# Patient Record
Sex: FEMALE | Race: WHITE | NOT HISPANIC OR LATINO
[De-identification: names, ages, dates, MRNs, and addresses within clinical notes are randomized per-mention and may not be internally consistent; named-entity substitution may affect disease eponyms.]

---

## 2020-07-24 ENCOUNTER — APPOINTMENT (OUTPATIENT)
Dept: RHEUMATOLOGY | Facility: CLINIC | Age: 45
End: 2020-07-24
Payer: COMMERCIAL

## 2020-07-24 VITALS
SYSTOLIC BLOOD PRESSURE: 122 MMHG | HEIGHT: 64.5 IN | BODY MASS INDEX: 29.85 KG/M2 | DIASTOLIC BLOOD PRESSURE: 78 MMHG | WEIGHT: 177 LBS

## 2020-07-24 DIAGNOSIS — Z82.49 FAMILY HISTORY OF ISCHEMIC HEART DISEASE AND OTHER DISEASES OF THE CIRCULATORY SYSTEM: ICD-10-CM

## 2020-07-24 DIAGNOSIS — Z87.2 PERSONAL HISTORY OF DISEASES OF THE SKIN AND SUBCUTANEOUS TISSUE: ICD-10-CM

## 2020-07-24 DIAGNOSIS — M25.50 PAIN IN UNSPECIFIED JOINT: ICD-10-CM

## 2020-07-24 DIAGNOSIS — Z86.19 PERSONAL HISTORY OF OTHER INFECTIOUS AND PARASITIC DISEASES: ICD-10-CM

## 2020-07-24 PROBLEM — Z00.00 ENCOUNTER FOR PREVENTIVE HEALTH EXAMINATION: Status: ACTIVE | Noted: 2020-07-24

## 2020-07-24 PROCEDURE — 99243 OFF/OP CNSLTJ NEW/EST LOW 30: CPT | Mod: 25

## 2020-07-24 PROCEDURE — 36415 COLL VENOUS BLD VENIPUNCTURE: CPT

## 2020-07-25 LAB
ALBUMIN SERPL ELPH-MCNC: 4.9 G/DL
ALP BLD-CCNC: 70 U/L
ALT SERPL-CCNC: 21 U/L
ANION GAP SERPL CALC-SCNC: 13 MMOL/L
AST SERPL-CCNC: 16 U/L
BASOPHILS # BLD AUTO: 0.07 K/UL
BASOPHILS NFR BLD AUTO: 1.1 %
BILIRUB SERPL-MCNC: 0.3 MG/DL
BUN SERPL-MCNC: 15 MG/DL
CALCIUM SERPL-MCNC: 9.9 MG/DL
CCP AB SER IA-ACNC: <8 UNITS
CHLORIDE SERPL-SCNC: 103 MMOL/L
CO2 SERPL-SCNC: 23 MMOL/L
CREAT SERPL-MCNC: 0.82 MG/DL
CRP SERPL-MCNC: 0.29 MG/DL
DSDNA AB SER-ACNC: <12 IU/ML
EOSINOPHIL # BLD AUTO: 0.11 K/UL
EOSINOPHIL NFR BLD AUTO: 1.7 %
ERYTHROCYTE [SEDIMENTATION RATE] IN BLOOD BY WESTERGREN METHOD: 15 MM/HR
GLUCOSE SERPL-MCNC: 93 MG/DL
HCT VFR BLD CALC: 36.9 %
HGB BLD-MCNC: 12.1 G/DL
IMM GRANULOCYTES NFR BLD AUTO: 0.2 %
LYMPHOCYTES # BLD AUTO: 2.25 K/UL
LYMPHOCYTES NFR BLD AUTO: 34.4 %
MAN DIFF?: NORMAL
MCHC RBC-ENTMCNC: 30.7 PG
MCHC RBC-ENTMCNC: 32.8 GM/DL
MCV RBC AUTO: 93.7 FL
MONOCYTES # BLD AUTO: 0.45 K/UL
MONOCYTES NFR BLD AUTO: 6.9 %
NEUTROPHILS # BLD AUTO: 3.66 K/UL
NEUTROPHILS NFR BLD AUTO: 55.7 %
PLATELET # BLD AUTO: 272 K/UL
POTASSIUM SERPL-SCNC: 4.3 MMOL/L
PROT SERPL-MCNC: 6.8 G/DL
RBC # BLD: 3.94 M/UL
RBC # FLD: 12.5 %
RF+CCP IGG SER-IMP: NEGATIVE
RHEUMATOID FACT SER QL: 10 IU/ML
SODIUM SERPL-SCNC: 139 MMOL/L
WBC # FLD AUTO: 6.55 K/UL

## 2020-07-26 NOTE — HISTORY OF PRESENT ILLNESS
[FreeTextEntry1] : 43 yo female referred by her PCP Dr. Méndez for further evaluation of joint pain.\par In 2016, she had Lyme Disease.  Since then she has had swelling of her fingers and her middle finger is sausage-y.  Lyme disease was diagnosed when she presented to her PMD with pain in her fingers.  Lyme tested positive and she was treated with 2 rounds of Abx.  Never saw a tick or rash.\par She saw Dr. Neumann a year ago, but she has since retired.  She took MTX for 3-4 months, but then she got itchy/burning skin, so she stopped it, and this symptom resolved.  MTX did help with the pain and swelling.  \par In March she was sick and think she had COVID.  She did test positive for antibodies.  \par After she had COVID, she had a flare-up.  Since March she has pain in her hands, especially with exposure to cold.  She takes Aleve, which helps.\par No personal or family history of psoriasis.\par + weight gain despite not changing her eating habits.  She has been exercising more.\par No other joint pains besides her hands.\par She has right sided sciatica.  She has done PT twice, which helps.  + low back stiffness, which is mostly first thing in the morning.  She is aware of the stiffness when she has been standing or sitting for too long.\par She get spimples on her chest ccasionally.  Not related to sun.  No photosensitivity.  + dry eyes due to contacts\par No oral ulcers.\par No Raynauds.\par

## 2020-07-27 LAB
ANA SER IF-ACNC: NEGATIVE
CHROMATIN AB SERPL-ACNC: <0.2 AL
ENA JO1 AB SER IA-ACNC: <0.2 AL
ENA RNP AB SER IA-ACNC: <0.2 AL
ENA SM AB SER IA-ACNC: <0.2 AL
ENA SS-A AB SER IA-ACNC: <0.2 AL
ENA SS-B AB SER IA-ACNC: <0.2 AL

## 2020-07-28 LAB

## 2020-07-29 ENCOUNTER — RESULT REVIEW (OUTPATIENT)
Age: 45
End: 2020-07-29

## 2020-07-29 LAB — RNA POLYMERASE III IGG: 4 UNITS

## 2020-08-14 ENCOUNTER — APPOINTMENT (OUTPATIENT)
Dept: RHEUMATOLOGY | Facility: CLINIC | Age: 45
End: 2020-08-14
Payer: COMMERCIAL

## 2020-08-14 VITALS
DIASTOLIC BLOOD PRESSURE: 80 MMHG | WEIGHT: 177 LBS | SYSTOLIC BLOOD PRESSURE: 120 MMHG | BODY MASS INDEX: 29.85 KG/M2 | HEIGHT: 64.5 IN

## 2020-08-14 DIAGNOSIS — M15.4 EROSIVE (OSTEO)ARTHRITIS: ICD-10-CM

## 2020-08-14 PROCEDURE — 99215 OFFICE O/P EST HI 40 MIN: CPT

## 2020-08-16 NOTE — ASSESSMENT
[FreeTextEntry1] : xrays reviewed together: erosive OA of DIP3\par To apply diclofenac gel to painful joints\par We discussed a trial of Plaquenil for erosive OA.  Side effect profile discussed, along with the needs for yearly ophthalmologic exams.

## 2020-08-16 NOTE — HISTORY OF PRESENT ILLNESS
[FreeTextEntry1] : She has painin her middle fingers in the morning.  + joint swelling.  + morning stiffness. left is worse than right (She is left handed)\par No new rashes.

## 2022-12-16 ENCOUNTER — APPOINTMENT (OUTPATIENT)
Dept: PAIN MANAGEMENT | Facility: CLINIC | Age: 47
End: 2022-12-16

## 2022-12-16 ENCOUNTER — NON-APPOINTMENT (OUTPATIENT)
Age: 47
End: 2022-12-16

## 2022-12-16 VITALS
HEIGHT: 64.5 IN | WEIGHT: 153 LBS | DIASTOLIC BLOOD PRESSURE: 83 MMHG | BODY MASS INDEX: 25.8 KG/M2 | SYSTOLIC BLOOD PRESSURE: 136 MMHG | TEMPERATURE: 98 F

## 2022-12-16 PROCEDURE — 99204 OFFICE O/P NEW MOD 45 MIN: CPT

## 2022-12-16 RX ORDER — HYDROXYCHLOROQUINE SULFATE 200 MG/1
200 TABLET, FILM COATED ORAL
Qty: 30 | Refills: 1 | Status: DISCONTINUED | COMMUNITY
Start: 2020-08-14 | End: 2022-12-16

## 2022-12-16 RX ORDER — GABAPENTIN 300 MG/1
300 CAPSULE ORAL
Qty: 30 | Refills: 0 | Status: ACTIVE | COMMUNITY
Start: 2022-12-16 | End: 1900-01-01

## 2022-12-16 NOTE — HISTORY OF PRESENT ILLNESS
[4] : 3. What number best describes how, during the past week, pain has interfered with your general activity? 4/10 pain [___ yrs] : [unfilled] year(s) ago [Constant] : constant [8] : an average pain level of 8/10 [9] : a maximum pain level of 9/10 [Sharp] : sharp [Dull] : dull [Aching] : aching [Throbbing] : throbbing [Shooting] : shooting [Laying] : laying [Sitting] : sitting [Heat] : heat [Ice] : ice [Other: ___] : [unfilled] [FreeTextEntry1] : HPI\par \par Ms. DEBRA CHAPIN is a 47 year F with pmhx of lyme disease, presents with back and left leg pain radiating to anterolateral thigh and shin pain.  Pain is so bad that patient finds it difficult to perform adls and ambulate. denies any worsening numbness, weakness, bowel/bladder dysfunction. \par \par \par Previous and current pain medications/doses/effects:\par \par Alleve\par Advil\par \par Previous Pain Treatments:\par \par PT\par \par Previous Pain Injections:\par \par NA\par \par Previous Diagnostic Studies/Images:\par \par XR BL Hand 7/20\par \par There is no acute fracture or dislocation. There is diffuse interphalangeal joint space narrowing\par and suggestion of possible early central erosions involving the bilateral third distal\par interphalangeal joints, which may be seen in the setting of inflammatory arthropathy. There is no\par focal soft tissue abnormality.\par \par \par  Impression:\par \par  Findings which may be seen in the setting of inflammatory arthropathy, most pronounced in the\par bilateral third distal interphalangeal joints. [FreeTextEntry2] : 12 [FreeTextEntry7] : Lower back pain , radiating down left side  [FreeTextEntry4] : stretching , aleve, advil

## 2022-12-16 NOTE — CONSULT LETTER
[Dear  ___] : Dear  [unfilled], [Consult Letter:] : I had the pleasure of evaluating your patient, [unfilled]. [Please see my note below.] : Please see my note below. [Consult Closing:] : Thank you very much for allowing me to participate in the care of this patient.  If you have any questions, please do not hesitate to contact me. [Sincerely,] : Sincerely, [FreeTextEntry3] : \par Maribeth Roach DO, MBA\par Director, Pain Management Center\par  of Anesthesiology\par [employer] [employer] [facility]\par \par \par

## 2022-12-16 NOTE — ASSESSMENT
[FreeTextEntry1] : >> Imaging and Other Studies\par \par I personally reviewed the relevant imaging.  Discussed and explained to patient the likely source of pathology and pain.  Questions answered.\par \par back and leg pain likely secondary to lumbar radiculopathy and discogenic pain refractory to conservative treatments including 6 consecutive weeks of home exercises/PT, will obtain MRI LS to evaluate for pathology\par \par may consider PT vs intervention pending eval\par \par >> Therapy and Other Modalities\par \par hold exercises\par \par >> Medications\par \par trial gabapentin nightly\par cautioned change in mood.  Encouraged to call with any worsening mood or depression/suicidal ideations\par  \par >> Interventions\par \par may consider intervention\par >> Consults\par \par >> Discussion of Risks/Benefits/Alternatives\par \par 	>Regarding any scheduled procedures:\par \par I have discussed in detail with the patient that any interventional pain procedure is associated with potential risks.  The procedure may include an injection of steroids and potentially other medications (local anesthetic and normal saline) into the epidural space or surrounding tissue of the spine.  There are significant risks of this procedure which include and are not limited to infection, bleeding, worsening pain, dural puncture leading to postdural puncture headache, nerve damage, spinal cord injury, paralysis, stroke, and death.  \par \par There is a chance that the procedure does not improve their pain.  \par \par There are risks associated with the steroid being absorbed into the body systemically.  These include dysphoria, difficulty sleeping, mood swings and personality changes.  Premenopausal women may notice an irregularity in her menstrual cycle for 2-3 months following the injection.  Steroids can specifically affect patients with hypertension, diabetes, and peptic ulcers.  The procedure may cause a temporary increase in blood pressure and blood pressure, and may adversely affect a peptic ulcer.  Other, more rare complications, include avascular necrosis of joints, glaucoma and worsening of osteoporosis. \par \par I have discussed the risks of the procedure at length with the patient, and the potential benefits of pain relief.  I have offered alternatives to the procedure.  All questions were answered.  \par \par The patient expressed understanding and wishes to proceed with the procedure.\par \par 	>Regarding COVID19 Pandemic: \par \par Any planned interventional pain procedure are scheduled because further delay may cause harm or negative outcome to patient.  The goal in performing this procedure is to avoid deterioration of function, emergency room visits (which increases exposure) and reliance on opioids.  \par \par r/b/a discussed with patient, lack of evidence to conclusively determine whether pain management procedures have any positive or negative impact on the possibility of rosa maria the virus and/or development of any sequelae. \par \par Patient counselled regarding timing steroid based intervention 2 weeks before or after COVID-19 vaccine administration to avoid any interaction or affect on efficacy of vaccination\par \par Patient demonstrates understanding\par \par Informed patient that risks associated with the COVID-19 infection.  Informed patient steps taken to limit the risks.  We are implementing safety precautions and following protocols consistent with the CDC and state recommendations. All patients and staff will be checked for fever or signs of illness upon entry to the facility. We will limit our steroid dose to the lowest effective therapeutic dose or in some cases steroids will not be injected at all. \par \par Patient agrees to proceed\par \par >> Conclusion\par \par The above diagnosis and treatment plan is medically reasonable and necessary based on the patient encounter \par There were no barriers to communication.\par Informed patient that I would be available for any additional questions.\par Patient was instructed to call with any worsening symptoms including severe pain, new numbness/weakness, or changes in the bowel/bladder function. \par Discussed role of nsaids in pain management and all relevant risks, if patient is continuing to require after 4 weeks the patient should f/u for alternative treatment. \par Instructed patient to maintain pain diary to monitor pain level, mobility, and function.\par \par The referring provider was informed of the above diagnosis and treatment plan.\par

## 2022-12-16 NOTE — REASON FOR VISIT
[Initial Consultation] : an initial pain management consultation [FreeTextEntry2] : Referred by Dr. Méndez

## 2022-12-16 NOTE — REVIEW OF SYSTEMS
[Back Pain] : back pain [Radiating Pain] : radiating pain [Joint Pain] : joint pain [Numbness] : numbness [Negative] : Heme/Lymph

## 2022-12-16 NOTE — PHYSICAL EXAM
[de-identified] : Constitutional: Normal, well developed, no acute distress\par Eyes: Symmetric, External structures \par Oropharynx: Lips normal, symmetric, no external lesions appreciated\par Respiratory: Non-labored breathing, no audible wheezes\par Cardiac: Pulse palpated, no tachycardia\par Vascular: No cyanosis appreciated, no edema in bilateral lower extremities\par GI: Nondistended, no jaundice appreciated\par Neurovascular: CN2-12 grossly intact, Alert and oriented\par MSK: Normal muscle bulk, 5/5 Motor strength B/L in LE\par \par

## 2023-01-12 ENCOUNTER — RESULT REVIEW (OUTPATIENT)
Age: 48
End: 2023-01-12

## 2023-01-19 ENCOUNTER — APPOINTMENT (OUTPATIENT)
Dept: PAIN MANAGEMENT | Facility: CLINIC | Age: 48
End: 2023-01-19
Payer: COMMERCIAL

## 2023-01-19 VITALS
HEIGHT: 64.5 IN | DIASTOLIC BLOOD PRESSURE: 80 MMHG | SYSTOLIC BLOOD PRESSURE: 118 MMHG | WEIGHT: 155 LBS | BODY MASS INDEX: 26.14 KG/M2

## 2023-01-19 PROCEDURE — 99214 OFFICE O/P EST MOD 30 MIN: CPT

## 2023-01-19 NOTE — HISTORY OF PRESENT ILLNESS
[___ yrs] : [unfilled] year(s) ago [Constant] : constant [8] : an average pain level of 8/10 [9] : a maximum pain level of 9/10 [Sharp] : sharp [Dull] : dull [Aching] : aching [Throbbing] : throbbing [Shooting] : shooting [Laying] : laying [Sitting] : sitting [Heat] : heat [Ice] : ice [Other: ___] : [unfilled] [FreeTextEntry1] : Interval Note:\par \par  \par \par Since last visit the pain is unchanged. Pain 8/10, worst at night. Pain to left anterolateral thigh and shin persists. \par Difficult to perform adls and sleep. Denies any recent infections.  Pain is so bad that patient finds it difficult to perform adls and ambulate. Denies any additional weakness, numbness, bowel/bladder dysfunction.  \par \par HPI\par \par Ms. DEBRA CHAPIN is a 47 year F with pmhx of lyme disease, presents with back and left leg pain radiating to anterolateral thigh and shin pain.  Pain is so bad that patient finds it difficult to perform adls and ambulate. denies any worsening numbness, weakness, bowel/bladder dysfunction. \par \par \par Previous and current pain medications/doses/effects:\par \par Alleve\par Advil\par \par Previous Pain Treatments:\par \par PT\par \par Previous Pain Injections:\par \par NA\par \par Previous Diagnostic Studies/Images:\par \par MRI LS 1/23\par \par LOWER THORACIC SPINE: No spinal canal or neuroforaminal stenosis.\par \par  L1-L2: No spinal canal or neuroforaminal stenosis.\par  L2-L3: No spinal canal or neuroforaminal stenosis.\par  L3-L4: There is a mild diffuse disc bulge flattening the ventral thecal sac and in close proximity\par to the right distal foraminal L3 exiting nerve root. There is mild left and mild to moderate right\par foraminal narrowing. There is moderate facet and ligamentous hypertrophy. There is no evidence of\par spinal canal stenosis.\par  L4-L5: There is a mild diffuse disc bulge mildly flattening the ventral thecal sac in close\par proximity to the distal right L4 foraminal exiting nerve root and contacting the bilateral\par descending L5 nerve roots. There is bilateral lateral recess narrowing. There is moderate bilateral\par foraminal narrowing. The left L4 foraminal exiting nerve root is within normal limits. There is\par moderate facet and ligamentous hypertrophy. The constellation of findings is causing mild spinal\par canal stenosis..\par  L5-S1: No spinal canal or neuroforaminal stenosis. The bilateral neuroforamen are patent. 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 L3/L4 no evidence of spinal canal stenosis.\par \par  L4-L5 mild diffuse disc bulge mildly in close proximity to the distal right L4 foraminal exiting\par nerve root and contacting the bilateral descending L5 nerve roots. L4/L5 mild spinal canal\par stenosis..\par \par \par XR BL Hand 7/20\par \par There is no acute fracture or dislocation. There is diffuse interphalangeal joint space narrowing\par and suggestion of possible early central erosions involving the bilateral third distal\par interphalangeal joints, which may be seen in the setting of inflammatory arthropathy. There is no\par focal soft tissue abnormality.\par \par \par  Impression:\par \par  Findings which may be seen in the setting of inflammatory arthropathy, most pronounced in the\par bilateral third distal interphalangeal joints. [FreeTextEntry7] : Lower back pain , radiating down left side  [FreeTextEntry4] : stretching , aleve, advil

## 2023-01-19 NOTE — ASSESSMENT
[FreeTextEntry1] : >> Imaging and Other Studies\par \par I personally reviewed the relevant imaging.  Discussed and explained to patient the likely source of pathology and pain.  Questions answered. MRI\par \par >> Therapy and Other Modalities\par \par hold exercises\par \par >> Medications\par \par gabapentin nightly\par cautioned change in mood.  Encouraged to call with any worsening mood or depression/suicidal ideations\par  \par >> Interventions\par \par Significant component of back and leg pain likely secondary to lumbar spinal stenosis demonstrated on MRI LS.  Will schedule L4-5 interlaminar epidural steroid injection r/b/a discussed\par \par \par >> Consults\par \par >> Discussion of Risks/Benefits/Alternatives\par \par 	>Regarding any scheduled procedures:\par \par I have discussed in detail with the patient that any interventional pain procedure is associated with potential risks.  The procedure may include an injection of steroids and potentially other medications (local anesthetic and normal saline) into the epidural space or surrounding tissue of the spine.  There are significant risks of this procedure which include and are not limited to infection, bleeding, worsening pain, dural puncture leading to postdural puncture headache, nerve damage, spinal cord injury, paralysis, stroke, and death.  \par \par There is a chance that the procedure does not improve their pain.  \par \par There are risks associated with the steroid being absorbed into the body systemically.  These include dysphoria, difficulty sleeping, mood swings and personality changes.  Premenopausal women may notice an irregularity in her menstrual cycle for 2-3 months following the injection.  Steroids can specifically affect patients with hypertension, diabetes, and peptic ulcers.  The procedure may cause a temporary increase in blood pressure and blood pressure, and may adversely affect a peptic ulcer.  Other, more rare complications, include avascular necrosis of joints, glaucoma and worsening of osteoporosis. \par \par I have discussed the risks of the procedure at length with the patient, and the potential benefits of pain relief.  I have offered alternatives to the procedure.  All questions were answered.  \par \par The patient expressed understanding and wishes to proceed with the procedure.\par \par 	>Regarding COVID19 Pandemic: \par \par Any planned interventional pain procedure are scheduled because further delay may cause harm or negative outcome to patient.  The goal in performing this procedure is to avoid deterioration of function, emergency room visits (which increases exposure) and reliance on opioids.  \par \par r/b/a discussed with patient, lack of evidence to conclusively determine whether pain management procedures have any positive or negative impact on the possibility of rosa maria the virus and/or development of any sequelae. \par \par Patient counselled regarding timing steroid based intervention 2 weeks before or after COVID-19 vaccine administration to avoid any interaction or affect on efficacy of vaccination\par \par Patient demonstrates understanding\par \par Informed patient that risks associated with the COVID-19 infection.  Informed patient steps taken to limit the risks.  We are implementing safety precautions and following protocols consistent with the CDC and state recommendations. All patients and staff will be checked for fever or signs of illness upon entry to the facility. We will limit our steroid dose to the lowest effective therapeutic dose or in some cases steroids will not be injected at all. \par \par Patient agrees to proceed\par \par >> Conclusion\par \par The above diagnosis and treatment plan is medically reasonable and necessary based on the patient encounter \par There were no barriers to communication.\par Informed patient that I would be available for any additional questions.\par Patient was instructed to call with any worsening symptoms including severe pain, new numbness/weakness, or changes in the bowel/bladder function. \par Discussed role of nsaids in pain management and all relevant risks, if patient is continuing to require after 4 weeks the patient should f/u for alternative treatment. \par Instructed patient to maintain pain diary to monitor pain level, mobility, and function.\par \par

## 2023-01-19 NOTE — PHYSICAL EXAM
[Normal] : Well developed, in no acute distress, alert and oriented to person, place and time [Normal muscle bulk without asymmetry] : normal muscle bulk without asymmetry [Facet Tenderness] : no facet tenderness [] : Motor: [NL] : normal and symmetric bilaterally

## 2023-01-20 ENCOUNTER — APPOINTMENT (OUTPATIENT)
Dept: PAIN MANAGEMENT | Facility: CLINIC | Age: 48
End: 2023-01-20
Payer: COMMERCIAL

## 2023-01-20 VITALS
HEART RATE: 84 BPM | RESPIRATION RATE: 16 BRPM | BODY MASS INDEX: 26.46 KG/M2 | SYSTOLIC BLOOD PRESSURE: 120 MMHG | HEIGHT: 64 IN | DIASTOLIC BLOOD PRESSURE: 79 MMHG | WEIGHT: 155 LBS | OXYGEN SATURATION: 100 %

## 2023-01-20 PROCEDURE — 62323 NJX INTERLAMINAR LMBR/SAC: CPT | Mod: LT

## 2023-01-20 RX ADMIN — Medication %: at 00:00

## 2023-01-20 RX ADMIN — TRIAMCINOLONE ACETONIDE 0 MG/ML: 80 INJECTION, SUSPENSION INTRA-ARTICULAR; INTRAMUSCULAR at 00:00

## 2023-01-20 RX ADMIN — IOHEXOL 0 MG/ML: 180 INJECTION INTRAVENOUS at 00:00

## 2023-01-20 NOTE — PROCEDURE
[FreeTextEntry1] : INTERLAMINAR EPIDURAL STEROID INJECTION\par \par The patient was placed in the prone position on the fluoroscopic table with a pillow under the abdomen.  Routine monitors were applied.  A surgical timeout was performed.  The back was prepped and draped in the usual sterile fashion and sterile technique was adhered to throughout the entire procedure.\par \par The [L4-5] was identified under fluroscopic guidance.  The vertebral body end plates were aligned and the spinous process was midline between the lateral processes.  The skin and subcutaneous tissues were infiltrated with [1% Lidocaine].  After adequate local anesthesia a ["20g Tuohy"] needle was inserted at [L4-5]   and aimed towards LEFT side.  \par \par Using a loss of resistance to air technique the epidural space was identified.  After negative aspiration for heme and CSF, 1cc Omnipaque N180 contrast dye was injected.  Epidural spread of contrast was confirmed in the AP and lateral views.  The patient was injected with a mixture of 80mg kenalog with 1cc lidocaine 1% and 2cc of PF normal saline in incremental amounts.\par \par The patient tolerated the procedure well.  There was no neurological deficit post procedure.  The patient went to recovery room in stable condition.  Discharge instructions were given to the patient.\par

## 2023-01-25 ENCOUNTER — NON-APPOINTMENT (OUTPATIENT)
Age: 48
End: 2023-01-25

## 2023-02-03 ENCOUNTER — APPOINTMENT (OUTPATIENT)
Dept: PAIN MANAGEMENT | Facility: CLINIC | Age: 48
End: 2023-02-03
Payer: COMMERCIAL

## 2023-02-03 VITALS
BODY MASS INDEX: 26.46 KG/M2 | SYSTOLIC BLOOD PRESSURE: 111 MMHG | DIASTOLIC BLOOD PRESSURE: 76 MMHG | HEIGHT: 64 IN | TEMPERATURE: 98 F | WEIGHT: 155 LBS

## 2023-02-03 PROCEDURE — 99214 OFFICE O/P EST MOD 30 MIN: CPT

## 2023-02-03 NOTE — HISTORY OF PRESENT ILLNESS
[2] : 3. What number best describes how, during the past week, pain has interfered with your general activity? 2/10 pain [___ yrs] : [unfilled] year(s) ago [Constant] : constant [8] : an average pain level of 8/10 [9] : a maximum pain level of 9/10 [Sharp] : sharp [Dull] : dull [Aching] : aching [Throbbing] : throbbing [Shooting] : shooting [Laying] : laying [Sitting] : sitting [Heat] : heat [Ice] : ice [Other: ___] : [unfilled] [FreeTextEntry1] : Interval Note:\par \par  \par sp L4-5 interlaminar epidural steroid injection 1/20/23 with 100% improvement in pain.  Patient doing very well.  Patient reports that she is having some night sweats and heavy period currently but otherwise minimal pain and able to tolerate exercises and adls. \par . Denies any additional weakness, numbness, bowel/bladder dysfunction.  \par \par HPI\par \par Ms. DEBRA CHAPIN is a 47 year F with pmhx of lyme disease, presents with back and left leg pain radiating to anterolateral thigh and shin pain.  Pain is so bad that patient finds it difficult to perform adls and ambulate. denies any worsening numbness, weakness, bowel/bladder dysfunction. \par \par \par Previous and current pain medications/doses/effects:\par \par Alleve\par Advil\par \par Previous Pain Treatments:\par \par PT\par \par Previous Pain Injections:\par \par  L4-5 interlaminar epidural steroid injection 1/20/23\par \par Previous Diagnostic Studies/Images:\par \par MRI LS 1/23\par \par LOWER THORACIC SPINE: No spinal canal or neuroforaminal stenosis.\par \par  L1-L2: No spinal canal or neuroforaminal stenosis.\par  L2-L3: No spinal canal or neuroforaminal stenosis.\par  L3-L4: There is a mild diffuse disc bulge flattening the ventral thecal sac and in close proximity\par to the right distal foraminal L3 exiting nerve root. There is mild left and mild to moderate right\par foraminal narrowing. There is moderate facet and ligamentous hypertrophy. There is no evidence of\par spinal canal stenosis.\par  L4-L5: There is a mild diffuse disc bulge mildly flattening the ventral thecal sac in close\par proximity to the distal right L4 foraminal exiting nerve root and contacting the bilateral\par descending L5 nerve roots. There is bilateral lateral recess narrowing. There is moderate bilateral\par foraminal narrowing. The left L4 foraminal exiting nerve root is within normal limits. There is\par moderate facet and ligamentous hypertrophy. The constellation of findings is causing mild spinal\par canal stenosis..\par  L5-S1: No spinal canal or neuroforaminal stenosis. The bilateral neuroforamen are patent. 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 L3/L4 no evidence of spinal canal stenosis.\par \par  L4-L5 mild diffuse disc bulge mildly in close proximity to the distal right L4 foraminal exiting\par nerve root and contacting the bilateral descending L5 nerve roots. L4/L5 mild spinal canal\par stenosis..\par \par \par XR BL Hand 7/20\par \par There is no acute fracture or dislocation. There is diffuse interphalangeal joint space narrowing\par and suggestion of possible early central erosions involving the bilateral third distal\par interphalangeal joints, which may be seen in the setting of inflammatory arthropathy. There is no\par focal soft tissue abnormality.\par \par \par  Impression:\par \par  Findings which may be seen in the setting of inflammatory arthropathy, most pronounced in the\par bilateral third distal interphalangeal joints. [FreeTextEntry2] : 6 [FreeTextEntry7] : Lower back pain , radiating down left side  [FreeTextEntry4] : stretching , aleve, advil

## 2023-02-03 NOTE — ASSESSMENT
[FreeTextEntry1] : >> Imaging and Other Studies\par \par I personally reviewed the relevant imaging.  Discussed and explained to patient the likely source of pathology and pain.  Questions answered. MRI\par \par >> Therapy and Other Modalities\par \par start PT - referral \par \par >> Medications\par \par gabapentin nightly\par cautioned change in mood.  Encouraged to call with any worsening mood or depression/suicidal ideations\par  \par >> Interventions\par \par Significant component of back and leg pain likely secondary to lumbar spinal stenosis demonstrated on MRI LS.  sp L4-5 interlaminar epidural steroid injection with significant improvement\par \par >> Consults\par \par heavy mentruation likely secondary to steroid effect and transient, if not improved recommend gyn fu\par \par >> Discussion of Risks/Benefits/Alternatives\par \par 	>Regarding any scheduled procedures:\par \par I have discussed in detail with the patient that any interventional pain procedure is associated with potential risks.  The procedure may include an injection of steroids and potentially other medications (local anesthetic and normal saline) into the epidural space or surrounding tissue of the spine.  There are significant risks of this procedure which include and are not limited to infection, bleeding, worsening pain, dural puncture leading to postdural puncture headache, nerve damage, spinal cord injury, paralysis, stroke, and death.  \par \par There is a chance that the procedure does not improve their pain.  \par \par There are risks associated with the steroid being absorbed into the body systemically.  These include dysphoria, difficulty sleeping, mood swings and personality changes.  Premenopausal women may notice an irregularity in her menstrual cycle for 2-3 months following the injection.  Steroids can specifically affect patients with hypertension, diabetes, and peptic ulcers.  The procedure may cause a temporary increase in blood pressure and blood pressure, and may adversely affect a peptic ulcer.  Other, more rare complications, include avascular necrosis of joints, glaucoma and worsening of osteoporosis. \par \par I have discussed the risks of the procedure at length with the patient, and the potential benefits of pain relief.  I have offered alternatives to the procedure.  All questions were answered.  \par \par The patient expressed understanding and wishes to proceed with the procedure.\par \par 	>Regarding COVID19 Pandemic: \par \par Any planned interventional pain procedure are scheduled because further delay may cause harm or negative outcome to patient.  The goal in performing this procedure is to avoid deterioration of function, emergency room visits (which increases exposure) and reliance on opioids.  \par \par r/b/a discussed with patient, lack of evidence to conclusively determine whether pain management procedures have any positive or negative impact on the possibility of rosa maria the virus and/or development of any sequelae. \par \par Patient counselled regarding timing steroid based intervention 2 weeks before or after COVID-19 vaccine administration to avoid any interaction or affect on efficacy of vaccination\par \par Patient demonstrates understanding\par \par Informed patient that risks associated with the COVID-19 infection.  Informed patient steps taken to limit the risks.  We are implementing safety precautions and following protocols consistent with the CDC and state recommendations. All patients and staff will be checked for fever or signs of illness upon entry to the facility. We will limit our steroid dose to the lowest effective therapeutic dose or in some cases steroids will not be injected at all. \par \par Patient agrees to proceed\par \par >> Conclusion\par \par The above diagnosis and treatment plan is medically reasonable and necessary based on the patient encounter \par There were no barriers to communication.\par Informed patient that I would be available for any additional questions.\par Patient was instructed to call with any worsening symptoms including severe pain, new numbness/weakness, or changes in the bowel/bladder function. \par Discussed role of nsaids in pain management and all relevant risks, if patient is continuing to require after 4 weeks the patient should f/u for alternative treatment. \par Instructed patient to maintain pain diary to monitor pain level, mobility, and function.\par \par

## 2023-03-03 ENCOUNTER — APPOINTMENT (OUTPATIENT)
Dept: PAIN MANAGEMENT | Facility: CLINIC | Age: 48
End: 2023-03-03

## 2023-04-27 ENCOUNTER — OFFICE (OUTPATIENT)
Dept: URBAN - METROPOLITAN AREA CLINIC 29 | Facility: CLINIC | Age: 48
Setting detail: OPHTHALMOLOGY
End: 2023-04-27
Payer: COMMERCIAL

## 2023-04-27 DIAGNOSIS — H16.223: ICD-10-CM

## 2023-04-27 PROCEDURE — 92014 COMPRE OPH EXAM EST PT 1/>: CPT | Performed by: OPTOMETRIST

## 2023-04-27 ASSESSMENT — KERATOMETRY
OD_AXISANGLE_DEGREES: 084
OS_AXISANGLE_DEGREES: 098
OS_K1POWER_DIOPTERS: 41.50
OS_K2POWER_DIOPTERS: 43.50
OD_K1POWER_DIOPTERS: 41.50
OD_K2POWER_DIOPTERS: 43.50

## 2023-04-27 ASSESSMENT — REFRACTION_MANIFEST
OD_SPHERE: -2.75
OD_CYLINDER: +0.75
OD_AXIS: 70
OD_AXIS: 70
OD_SPHERE: -3.25
OD_CYLINDER: +0.75
OD_VA1: 20/20
OS_VA1: 20/20
OD_AXIS: 70
OD_ADD: +1.25
OD_VA1: 20/20
OD_CYLINDER: +1.25
OS_CYLINDER: +0.75
OS_SPHERE: -3.00
OS_ADD: +1.00
OD_VA1: 20/20
OS_VA1: 20/20
OD_ADD: +1.00
OS_SPHERE: -2.75
OS_ADD: +1.25
OS_CYLINDER: +0.75
OS_AXIS: 100
OS_AXIS: 100
OD_ADD: +1.00
OS_VA1: 20/20
OS_AXIS: 100
OS_SPHERE: -2.50
OD_SPHERE: -3.00
OS_CYLINDER: +0.75
OS_ADD: +1.00

## 2023-04-27 ASSESSMENT — REFRACTION_CURRENTRX
OD_OVR_VA: 20/
OD_OVR_VA: 20/
OD_CYLINDER: +0.75
OS_SPHERE: +1.25
OD_AXIS: 070
OS_AXIS: 105
OS_SPHERE: -3.00
OS_OVR_VA: 20/
OS_OVR_VA: 20/
OD_SPHERE: +1.25
OS_CYLINDER: +0.75
OD_SPHERE: -3.25

## 2023-04-27 ASSESSMENT — REFRACTION_AUTOREFRACTION
OS_CYLINDER: +0.75
OS_AXIS: 90
OD_CYLINDER: +1.00
OD_SPHERE: -2.75
OD_AXIS: 80
OS_SPHERE: -2.25

## 2023-04-27 ASSESSMENT — VISUAL ACUITY
OD_BCVA: 20/20
OS_BCVA: 20/30+2

## 2023-04-27 ASSESSMENT — SPHEQUIV_DERIVED
OD_SPHEQUIV: -2.625
OD_SPHEQUIV: -2.375
OS_SPHEQUIV: -1.875
OS_SPHEQUIV: -2.375
OS_SPHEQUIV: -2.625
OD_SPHEQUIV: -2.625
OD_SPHEQUIV: -2.25
OS_SPHEQUIV: -2.125

## 2023-04-27 ASSESSMENT — TEAR BREAK UP TIME (TBUT)
OD_TBUT: T
OS_TBUT: T

## 2023-04-27 ASSESSMENT — CONFRONTATIONAL VISUAL FIELD TEST (CVF)
OS_FINDINGS: FULL
OD_FINDINGS: FULL

## 2023-04-27 ASSESSMENT — AXIALLENGTH_DERIVED
OD_AL: 25.069
OD_AL: 24.9051
OD_AL: 24.9595
OS_AL: 24.8509
OD_AL: 25.069
OS_AL: 24.7433
OS_AL: 25.069
OS_AL: 24.9595

## 2023-04-27 ASSESSMENT — TONOMETRY
OD_IOP_MMHG: 15
OS_IOP_MMHG: 15

## 2024-01-16 ENCOUNTER — APPOINTMENT (OUTPATIENT)
Dept: PAIN MANAGEMENT | Facility: CLINIC | Age: 49
End: 2024-01-16
Payer: COMMERCIAL

## 2024-01-16 VITALS
BODY MASS INDEX: 28.17 KG/M2 | DIASTOLIC BLOOD PRESSURE: 74 MMHG | HEIGHT: 64 IN | HEART RATE: 80 BPM | WEIGHT: 165 LBS | SYSTOLIC BLOOD PRESSURE: 118 MMHG

## 2024-01-16 DIAGNOSIS — M79.2 NEURALGIA AND NEURITIS, UNSPECIFIED: ICD-10-CM

## 2024-01-16 DIAGNOSIS — G89.4 CHRONIC PAIN SYNDROME: ICD-10-CM

## 2024-01-16 DIAGNOSIS — M79.18 MYALGIA, OTHER SITE: ICD-10-CM

## 2024-01-16 PROCEDURE — G2211 COMPLEX E/M VISIT ADD ON: CPT

## 2024-01-16 PROCEDURE — 99214 OFFICE O/P EST MOD 30 MIN: CPT

## 2024-01-16 RX ORDER — GABAPENTIN 300 MG/1
300 CAPSULE ORAL
Qty: 30 | Refills: 1 | Status: ACTIVE | COMMUNITY
Start: 2024-01-16 | End: 1900-01-01

## 2024-01-16 NOTE — ASSESSMENT
[FreeTextEntry1] : >> Imaging and Other Studies    I personally reviewed the relevant imaging. Discussed and explained to patient the likely source of pathology and pain. Questions answered. MRI    >> Therapy and Other Modalities    start PT - referral    >> Medications    gabapentin nightly  cautioned change in mood. Encouraged to call with any worsening mood or depression/suicidal ideations    >> Interventions    Significant component of back and leg pain likely secondary to lumbar spinal stenosis demonstrated on MRI LS. sp L4-5 interlaminar epidural steroid injection with significant improvement  given efficacy of previous intervention that is >80% improvement in pain and improved ability to perform adls, and return of pain despite conservative treatment, will schedule repeat  L4-5 interlaminar epidural steroid injection r/b/a discussed   >> Consults    heavy mentruation likely secondary to steroid effect and transient, if not improved recommend gyn fu    >> Discussion of Risks/Benefits/Alternatives     >Regarding any scheduled procedures:    I have discussed in detail with the patient that any interventional pain procedure is associated with potential risks. The procedure may include an injection of steroids and potentially other medications (local anesthetic and normal saline) into the epidural space or surrounding tissue of the spine. There are significant risks of this procedure which include and are not limited to infection, bleeding, worsening pain, dural puncture leading to postdural puncture headache, nerve damage, spinal cord injury, paralysis, stroke, and death.    There is a chance that the procedure does not improve their pain.    There are risks associated with the steroid being absorbed into the body systemically. These include dysphoria, difficulty sleeping, mood swings and personality changes. Premenopausal women may notice an irregularity in her menstrual cycle for 2-3 months following the injection. Steroids can specifically affect patients with hypertension, diabetes, and peptic ulcers. The procedure may cause a temporary increase in blood pressure and blood pressure, and may adversely affect a peptic ulcer. Other, more rare complications, include avascular necrosis of joints, glaucoma and worsening of osteoporosis.    I have discussed the risks of the procedure at length with the patient, and the potential benefits of pain relief. I have offered alternatives to the procedure. All questions were answered.    The patient expressed understanding and wishes to proceed with the procedure.     >Regarding COVID19 Pandemic:    Any planned interventional pain procedure are scheduled because further delay may cause harm or negative outcome to patient. The goal in performing this procedure is to avoid deterioration of function, emergency room visits (which increases exposure) and reliance on opioids.    r/b/a discussed with patient, lack of evidence to conclusively determine whether pain management procedures have any positive or negative impact on the possibility of rosa maria the virus and/or development of any sequelae.    Patient counselled regarding timing steroid based intervention 2 weeks before or after COVID-19 vaccine administration to avoid any interaction or affect on efficacy of vaccination    Patient demonstrates understanding    Informed patient that risks associated with the COVID-19 infection. Informed patient steps taken to limit the risks. We are implementing safety precautions and following protocols consistent with the CDC and state recommendations. All patients and staff will be checked for fever or signs of illness upon entry to the facility. We will limit our steroid dose to the lowest effective therapeutic dose or in some cases steroids will not be injected at all.    Patient agrees to proceed    >> Conclusion    The above diagnosis and treatment plan is medically reasonable and necessary based on the patient encounter  There were no barriers to communication.  Informed patient that I would be available for any additional questions.  Patient was instructed to call with any worsening symptoms including severe pain, new numbness/weakness, or changes in the bowel/bladder function.  Discussed role of nsaids in pain management and all relevant risks, if patient is continuing to require after 4 weeks the patient should f/u for alternative treatment.  Instructed patient to maintain pain diary to monitor pain level, mobility, and function.

## 2024-01-16 NOTE — HISTORY OF PRESENT ILLNESS
[___ yrs] : [unfilled] year(s) ago [Constant] : constant [8] : an average pain level of 8/10 [9] : a maximum pain level of 9/10 [Sharp] : sharp [Dull] : dull [Aching] : aching [Throbbing] : throbbing [Shooting] : shooting [Laying] : laying [Sitting] : sitting [Heat] : heat [Ice] : ice [Other: ___] : [unfilled] [2] : 3. What number best describes how, during the past week, pain has interfered with your general activity? 2/10 pain [FreeTextEntry1] : Interval Note:    Returns complaining of bilateral back and left leg pain.  Pain is so bad that patient finds it difficult to perform adls and ambulate.  Difficulty sleeping because of pain. . Denies any additional weakness, numbness, bowel/bladder dysfunction.    HPI  Ms. DEBRA CHAPIN is a 48 year F with pmhx of lyme disease, presents with back and left leg pain radiating to anterolateral thigh and shin pain.  Pain is so bad that patient finds it difficult to perform adls and ambulate. denies any worsening numbness, weakness, bowel/bladder dysfunction.    Previous and current pain medications/doses/effects:  Alleve Advil  Previous Pain Treatments:  PT  Previous Pain Injections:   L4-5 interlaminar epidural steroid injection 1/20/23  Previous Diagnostic Studies/Images:  MRI LS 1/23  LOWER THORACIC SPINE: No spinal canal or neuroforaminal stenosis.   L1-L2: No spinal canal or neuroforaminal stenosis.  L2-L3: No spinal canal or neuroforaminal stenosis.  L3-L4: There is a mild diffuse disc bulge flattening the ventral thecal sac and in close proximity to the right distal foraminal L3 exiting nerve root. There is mild left and mild to moderate right foraminal narrowing. There is moderate facet and ligamentous hypertrophy. There is no evidence of spinal canal stenosis.  L4-L5: There is a mild diffuse disc bulge mildly flattening the ventral thecal sac in close proximity to the distal right L4 foraminal exiting nerve root and contacting the bilateral descending L5 nerve roots. There is bilateral lateral recess narrowing. There is moderate bilateral foraminal narrowing. The left L4 foraminal exiting nerve root is within normal limits. There is moderate facet and ligamentous hypertrophy. The constellation of findings is causing mild spinal canal stenosis..  L5-S1: No spinal canal or neuroforaminal stenosis. The bilateral neuroforamen are patent. 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 L3/L4 no evidence of spinal canal stenosis.   L4-L5 mild diffuse disc bulge mildly in close proximity to the distal right L4 foraminal exiting nerve root and contacting the bilateral descending L5 nerve roots. L4/L5 mild spinal canal stenosis..   XR BL Hand 7/20  There is no acute fracture or dislocation. There is diffuse interphalangeal joint space narrowing and suggestion of possible early central erosions involving the bilateral third distal interphalangeal joints, which may be seen in the setting of inflammatory arthropathy. There is no focal soft tissue abnormality.    Impression:   Findings which may be seen in the setting of inflammatory arthropathy, most pronounced in the bilateral third distal interphalangeal joints. [FreeTextEntry7] : Lower back pain , radiating down left side  [FreeTextEntry4] : stretching , aleve, advil [FreeTextEntry2] : 6

## 2024-01-30 ENCOUNTER — APPOINTMENT (OUTPATIENT)
Dept: PAIN MANAGEMENT | Facility: CLINIC | Age: 49
End: 2024-01-30
Payer: COMMERCIAL

## 2024-01-30 VITALS
BODY MASS INDEX: 28.17 KG/M2 | RESPIRATION RATE: 16 BRPM | WEIGHT: 165 LBS | HEART RATE: 79 BPM | HEIGHT: 64 IN | SYSTOLIC BLOOD PRESSURE: 135 MMHG | OXYGEN SATURATION: 98 % | DIASTOLIC BLOOD PRESSURE: 79 MMHG

## 2024-01-30 DIAGNOSIS — M54.16 RADICULOPATHY, LUMBAR REGION: ICD-10-CM

## 2024-01-30 PROCEDURE — 62323 NJX INTERLAMINAR LMBR/SAC: CPT

## 2024-01-30 RX ADMIN — TRIAMCINOLONE ACETONIDE MG/ML: 10 INJECTION, SUSPENSION INTRA-ARTICULAR; INTRALESIONAL at 00:00

## 2024-02-13 ENCOUNTER — APPOINTMENT (OUTPATIENT)
Dept: PAIN MANAGEMENT | Facility: CLINIC | Age: 49
End: 2024-02-13

## 2024-05-13 ENCOUNTER — OFFICE (OUTPATIENT)
Dept: URBAN - METROPOLITAN AREA CLINIC 29 | Facility: CLINIC | Age: 49
Setting detail: OPHTHALMOLOGY
End: 2024-05-13
Payer: COMMERCIAL

## 2024-05-13 DIAGNOSIS — H16.223: ICD-10-CM

## 2024-05-13 PROCEDURE — 92014 COMPRE OPH EXAM EST PT 1/>: CPT | Performed by: OPTOMETRIST

## 2024-05-13 ASSESSMENT — CONFRONTATIONAL VISUAL FIELD TEST (CVF)
OD_FINDINGS: FULL
OS_FINDINGS: FULL

## 2024-10-03 ENCOUNTER — TRANSCRIPTION ENCOUNTER (OUTPATIENT)
Age: 49
End: 2024-10-03

## 2025-02-14 ENCOUNTER — APPOINTMENT (OUTPATIENT)
Dept: RHEUMATOLOGY | Facility: CLINIC | Age: 50
End: 2025-02-14

## 2025-02-14 ENCOUNTER — RESULT REVIEW (OUTPATIENT)
Age: 50
End: 2025-02-14

## 2025-02-14 VITALS
DIASTOLIC BLOOD PRESSURE: 78 MMHG | WEIGHT: 180 LBS | SYSTOLIC BLOOD PRESSURE: 120 MMHG | HEIGHT: 64 IN | BODY MASS INDEX: 30.73 KG/M2 | HEART RATE: 92 BPM | OXYGEN SATURATION: 98 %

## 2025-02-14 DIAGNOSIS — M19.90 UNSPECIFIED OSTEOARTHRITIS, UNSPECIFIED SITE: ICD-10-CM

## 2025-02-14 DIAGNOSIS — G56.00 CARPAL TUNNEL SYNDROME, UNSPECIFIED UPPER LIMB: ICD-10-CM

## 2025-02-14 PROCEDURE — 99204 OFFICE O/P NEW MOD 45 MIN: CPT

## 2025-02-14 RX ORDER — BUPROPION HYDROCHLORIDE 100 MG/1
TABLET, FILM COATED ORAL
Refills: 0 | Status: ACTIVE | COMMUNITY

## 2025-02-17 LAB
ALBUMIN SERPL ELPH-MCNC: 4.4 G/DL
ALP BLD-CCNC: 85 U/L
ALT SERPL-CCNC: 29 U/L
ANION GAP SERPL CALC-SCNC: 12 MMOL/L
AST SERPL-CCNC: 19 U/L
BASOPHILS # BLD AUTO: 0.07 K/UL
BASOPHILS NFR BLD AUTO: 1 %
BILIRUB SERPL-MCNC: <0.2 MG/DL
BUN SERPL-MCNC: 22 MG/DL
CALCIUM SERPL-MCNC: 9.7 MG/DL
CCP AB SER IA-ACNC: <8 U/ML
CHLORIDE SERPL-SCNC: 103 MMOL/L
CO2 SERPL-SCNC: 24 MMOL/L
CREAT SERPL-MCNC: 0.72 MG/DL
CRP SERPL-MCNC: 3 MG/L
EGFR: 103 ML/MIN/1.73M2
EOSINOPHIL # BLD AUTO: 0.14 K/UL
EOSINOPHIL NFR BLD AUTO: 2 %
ERYTHROCYTE [SEDIMENTATION RATE] IN BLOOD BY WESTERGREN METHOD: 15 MM/HR
GLUCOSE SERPL-MCNC: 93 MG/DL
HCT VFR BLD CALC: 33.6 %
HGB BLD-MCNC: 10.6 G/DL
IMM GRANULOCYTES NFR BLD AUTO: 0.4 %
LYMPHOCYTES # BLD AUTO: 2.03 K/UL
LYMPHOCYTES NFR BLD AUTO: 29.3 %
MAN DIFF?: NORMAL
MCHC RBC-ENTMCNC: 28.9 PG
MCHC RBC-ENTMCNC: 31.5 G/DL
MCV RBC AUTO: 91.6 FL
MONOCYTES # BLD AUTO: 0.47 K/UL
MONOCYTES NFR BLD AUTO: 6.8 %
NEUTROPHILS # BLD AUTO: 4.18 K/UL
NEUTROPHILS NFR BLD AUTO: 60.5 %
PLATELET # BLD AUTO: 322 K/UL
POTASSIUM SERPL-SCNC: 4.2 MMOL/L
PROT SERPL-MCNC: 6.8 G/DL
RBC # BLD: 3.67 M/UL
RBC # FLD: 13.2 %
RF+CCP IGG SER-IMP: NEGATIVE
RHEUMATOID FACT SER QL: <10 IU/ML
SODIUM SERPL-SCNC: 139 MMOL/L
URATE SERPL-MCNC: 3 MG/DL
WBC # FLD AUTO: 6.92 K/UL

## 2025-05-19 ENCOUNTER — OFFICE (OUTPATIENT)
Dept: URBAN - METROPOLITAN AREA CLINIC 29 | Facility: CLINIC | Age: 50
Setting detail: OPHTHALMOLOGY
End: 2025-05-19
Payer: COMMERCIAL

## 2025-05-19 DIAGNOSIS — H16.223: ICD-10-CM

## 2025-05-19 PROCEDURE — 92014 COMPRE OPH EXAM EST PT 1/>: CPT | Performed by: OPTOMETRIST

## 2025-05-19 ASSESSMENT — REFRACTION_MANIFEST
OS_CYLINDER: +0.75
OS_AXIS: 100
OD_SPHERE: -3.00
OS_ADD: +1.00
OS_AXIS: 100
OS_AXIS: 100
OD_SPHERE: -2.75
OS_SPHERE: -3.00
OD_VA1: 20/20
OD_ADD: +1.00
OD_CYLINDER: +0.75
OS_VA1: 20/20
OD_SPHERE: -2.75
OD_AXIS: 70
OD_AXIS: 70
OS_CYLINDER: +0.75
OS_ADD: +1.00
OD_CYLINDER: +0.75
OS_CYLINDER: +0.75
OD_VA1: 20/20
OS_AXIS: 100
OD_SPHERE: -3.25
OD_VA1: 20/20
OD_AXIS: 70
OD_VA1: 20/20
OD_ADD: +1.25
OS_ADD: +1.00
OD_AXIS: 70
OS_CYLINDER: +0.75
OD_VA1: 20/20
OS_SPHERE: -2.75
OD_ADD: +1.00
OS_SPHERE: -2.50
OD_CYLINDER: +0.75
OS_SPHERE: -2.50
OS_VA1: 20/20
OD_ADD: +1.00
OS_CYLINDER: +0.75
OS_ADD: +1.00
OS_SPHERE: -2.50
OS_VA1: 20/20
OS_VA1: 20/20
OD_SPHERE: -2.75
OS_ADD: +1.25
OS_VA1: 20/20
OS_AXIS: 100
OD_ADD: +1.00
OD_AXIS: 70
OD_CYLINDER: +0.75
OD_CYLINDER: +1.25

## 2025-05-19 ASSESSMENT — REFRACTION_CURRENTRX
OS_SPHERE: -3.25
OD_OVR_VA: 20/
OD_SPHERE: -3.50
OS_OVR_VA: 20/
OS_OVR_VA: 20/
OD_SPHERE: +1.25
OD_OVR_VA: 20/
OS_SPHERE: +1.25
OS_AXIS: 101
OS_CYLINDER: +0.75
OD_AXIS: 74
OD_CYLINDER: +1.00

## 2025-05-19 ASSESSMENT — REFRACTION_AUTOREFRACTION
OD_CYLINDER: +1.50
OD_SPHERE: -3.25
OS_SPHERE: -2.25
OS_CYLINDER: +1.00
OS_AXIS: 088
OD_AXIS: 082

## 2025-05-19 ASSESSMENT — KERATOMETRY
OS_K2POWER_DIOPTERS: 43.25
OD_K1POWER_DIOPTERS: 41.50
OD_AXISANGLE_DEGREES: 077
OD_K2POWER_DIOPTERS: 43.50
OS_AXISANGLE_DEGREES: 098
OS_K1POWER_DIOPTERS: 41.75

## 2025-05-19 ASSESSMENT — TEAR BREAK UP TIME (TBUT)
OS_TBUT: T
OD_TBUT: T

## 2025-05-19 ASSESSMENT — CONFRONTATIONAL VISUAL FIELD TEST (CVF)
OS_FINDINGS: FULL
OD_FINDINGS: FULL

## 2025-05-19 ASSESSMENT — VISUAL ACUITY
OS_BCVA: 20/30-1
OD_BCVA: 20/20

## 2025-09-06 ENCOUNTER — NON-APPOINTMENT (OUTPATIENT)
Age: 50
End: 2025-09-06

## 2025-09-08 ENCOUNTER — NON-APPOINTMENT (OUTPATIENT)
Age: 50
End: 2025-09-08

## 2025-09-08 ENCOUNTER — APPOINTMENT (OUTPATIENT)
Dept: NEUROLOGY | Facility: CLINIC | Age: 50
End: 2025-09-08
Payer: COMMERCIAL

## 2025-09-08 VITALS
WEIGHT: 180 LBS | DIASTOLIC BLOOD PRESSURE: 81 MMHG | OXYGEN SATURATION: 98 % | SYSTOLIC BLOOD PRESSURE: 125 MMHG | HEIGHT: 64 IN | BODY MASS INDEX: 30.73 KG/M2 | HEART RATE: 81 BPM

## 2025-09-08 PROCEDURE — 99204 OFFICE O/P NEW MOD 45 MIN: CPT

## 2025-09-08 RX ORDER — NAPROXEN SODIUM 220 MG
CAPSULE ORAL
Refills: 0 | Status: ACTIVE | COMMUNITY

## 2025-09-10 ENCOUNTER — APPOINTMENT (OUTPATIENT)
Dept: NEUROLOGY | Facility: CLINIC | Age: 50
End: 2025-09-10
Payer: COMMERCIAL

## 2025-09-10 DIAGNOSIS — G56.00 CARPAL TUNNEL SYNDROME, UNSPECIFIED UPPER LIMB: ICD-10-CM

## 2025-09-10 PROCEDURE — 95913 NRV CNDJ TEST 13/> STUDIES: CPT

## 2025-09-10 PROCEDURE — 95886 MUSC TEST DONE W/N TEST COMP: CPT
